# Patient Record
Sex: MALE | Race: WHITE | Employment: OTHER | URBAN - METROPOLITAN AREA
[De-identification: names, ages, dates, MRNs, and addresses within clinical notes are randomized per-mention and may not be internally consistent; named-entity substitution may affect disease eponyms.]

---

## 2019-01-16 PROBLEM — I34.0 NON-RHEUMATIC MITRAL REGURGITATION: Status: ACTIVE | Noted: 2019-01-16

## 2019-01-16 PROBLEM — I27.20 PULMONARY HYPERTENSION (HCC): Status: ACTIVE | Noted: 2019-01-16

## 2019-01-16 PROBLEM — I36.1 NON-RHEUMATIC TRICUSPID VALVE INSUFFICIENCY: Status: ACTIVE | Noted: 2019-01-16

## 2019-07-25 PROBLEM — N32.3 DIVERTICULUM OF BLADDER: Status: ACTIVE | Noted: 2018-10-10

## 2019-07-25 PROBLEM — R35.0 INCREASED FREQUENCY OF URINATION: Status: ACTIVE | Noted: 2019-07-25

## 2019-07-25 PROBLEM — F41.9 ANXIETY: Status: ACTIVE | Noted: 2019-07-25

## 2019-07-25 PROBLEM — K40.90 INGUINAL HERNIA: Status: ACTIVE | Noted: 2019-07-25

## 2019-07-25 PROBLEM — N32.89 DECREASED BLADDER CAPACITY: Status: ACTIVE | Noted: 2018-10-10

## 2019-07-25 PROBLEM — G56.00 CARPAL TUNNEL SYNDROME: Status: ACTIVE | Noted: 2019-07-25

## 2019-07-25 PROBLEM — E55.9 VITAMIN D DEFICIENCY: Status: ACTIVE | Noted: 2019-07-25

## 2019-07-25 PROBLEM — I35.1 MILD AORTIC INSUFFICIENCY: Status: ACTIVE | Noted: 2019-07-25

## 2019-07-25 PROBLEM — J30.9 ALLERGIC RHINITIS: Status: ACTIVE | Noted: 2019-07-25

## 2019-07-25 PROBLEM — R31.9 HEMATURIA: Status: ACTIVE | Noted: 2019-07-25

## 2020-01-13 PROBLEM — I48.92 ATRIAL FLUTTER (HCC): Status: ACTIVE | Noted: 2020-01-13

## 2020-01-30 PROBLEM — I45.5 SINUS PAUSE: Status: ACTIVE | Noted: 2020-01-30

## 2020-08-17 PROBLEM — Z95.0 PACEMAKER: Status: ACTIVE | Noted: 2020-08-17

## 2023-04-13 ENCOUNTER — HOSPITAL ENCOUNTER (EMERGENCY)
Facility: CLINIC | Age: 88
Discharge: HOME | End: 2023-04-13
Attending: SPECIALIST
Payer: MEDICARE

## 2023-04-13 VITALS
SYSTOLIC BLOOD PRESSURE: 134 MMHG | TEMPERATURE: 97.8 F | HEIGHT: 72 IN | OXYGEN SATURATION: 98 % | BODY MASS INDEX: 28.44 KG/M2 | RESPIRATION RATE: 14 BRPM | DIASTOLIC BLOOD PRESSURE: 81 MMHG | WEIGHT: 210 LBS | HEART RATE: 71 BPM

## 2023-04-13 DIAGNOSIS — R33.9 URINARY RETENTION: Primary | ICD-10-CM

## 2023-04-13 DIAGNOSIS — R31.9 HEMATURIA, UNSPECIFIED TYPE: ICD-10-CM

## 2023-04-13 LAB
BACTERIA: ABNORMAL
BILIRUBIN URINE: NEGATIVE
COLOR: YELLOW
EPITHELIAL CELLS UA: ABNORMAL /HPF (ref 0–5)
GLUCOSE UR STRIP.AUTO-MCNC: NEGATIVE MG/DL
KETONES UR STRIP.AUTO-MCNC: NEGATIVE MG/DL
LEUKOCYTE ESTERASE UR QL STRIP.AUTO: NEGATIVE
NITRITE UR QL STRIP.AUTO: NEGATIVE
OTHER OBSERVATIONS UA: ABNORMAL
PROT UR STRIP.AUTO-MCNC: 6.5 MG/DL (ref 5–8)
PROT UR STRIP.AUTO-MCNC: NEGATIVE MG/DL
RBC CLUMPS #/AREA URNS AUTO: ABNORMAL /HPF (ref 0–2)
SPECIFIC GRAVITY UA: 1.01 (ref 1–1.03)
TURBIDITY: CLEAR
URINE HGB: ABNORMAL
UROBILINOGEN, URINE: NORMAL
WBC UA: ABNORMAL /HPF (ref 0–5)

## 2023-04-13 PROCEDURE — 81001 URINALYSIS AUTO W/SCOPE: CPT

## 2023-04-13 PROCEDURE — 99283 EMERGENCY DEPT VISIT LOW MDM: CPT

## 2023-04-13 PROCEDURE — 51798 US URINE CAPACITY MEASURE: CPT

## 2023-04-13 PROCEDURE — 51702 INSERT TEMP BLADDER CATH: CPT

## 2023-04-13 RX ORDER — LIDOCAINE HYDROCHLORIDE 20 MG/ML
JELLY TOPICAL ONCE
Status: DISCONTINUED | OUTPATIENT
Start: 2023-04-13 | End: 2023-04-13 | Stop reason: HOSPADM

## 2023-04-13 ASSESSMENT — PAIN - FUNCTIONAL ASSESSMENT: PAIN_FUNCTIONAL_ASSESSMENT: 0-10

## 2023-04-13 ASSESSMENT — ENCOUNTER SYMPTOMS
SHORTNESS OF BREATH: 0
ABDOMINAL PAIN: 0
ABDOMINAL DISTENTION: 1
COUGH: 0
NAUSEA: 0

## 2023-04-13 ASSESSMENT — LIFESTYLE VARIABLES
HOW OFTEN DO YOU HAVE A DRINK CONTAINING ALCOHOL: NEVER
HOW MANY STANDARD DRINKS CONTAINING ALCOHOL DO YOU HAVE ON A TYPICAL DAY: PATIENT DOES NOT DRINK

## 2023-04-13 ASSESSMENT — PAIN SCALES - GENERAL: PAINLEVEL_OUTOF10: 10

## 2023-04-13 NOTE — ED PROVIDER NOTES
Constitutional:       General: He is not in acute distress. Appearance: He is well-developed. HENT:      Head: Normocephalic and atraumatic. Nose: Nose normal.   Eyes:      Extraocular Movements: Extraocular movements intact. Pupils: Pupils are equal, round, and reactive to light. Cardiovascular:      Rate and Rhythm: Normal rate and regular rhythm. Heart sounds: Normal heart sounds. No murmur heard. Pulmonary:      Effort: Pulmonary effort is normal. No respiratory distress. Breath sounds: Normal breath sounds. Abdominal:      General: Bowel sounds are normal. There is no distension or abdominal bruit. Palpations: Abdomen is soft. There is no pulsatile mass. Tenderness: There is no abdominal tenderness. Musculoskeletal:      Cervical back: Normal range of motion and neck supple. Skin:     General: Skin is warm and dry. Neurological:      General: No focal deficit present. Mental Status: He is alert and oriented to person, place, and time. Psychiatric:         Behavior: Behavior normal.         Thought Content: Thought content normal.         DIFFERENTIAL DIAGNOSIS/ MDM:     Differential diagnosis considered: Acute urinary retention due to BPH, prostate CA, UTI with hematuria      DIAGNOSTIC RESULTS     EKG: All EKG's are interpreted by the Emergency Department Physician who either signs or Co-signs this chart in the absence of a cardiologist.    None obtained    RADIOLOGY:   I reviewed the radiologist interpretations:  No orders to display       No results found.         LABS:  Labs Reviewed   URINALYSIS WITH REFLEX TO CULTURE - Abnormal; Notable for the following components:       Result Value    Urine Hgb LARGE (*)     All other components within normal limits   MICROSCOPIC URINALYSIS - Abnormal; Notable for the following components:    Bacteria, UA MODERATE (*)     Other Observations UA   (*)     Value: Utilizing a urinalysis as the only screening method to

## 2023-04-13 NOTE — DISCHARGE INSTRUCTIONS
Please understand that at this time there is no evidence for a more serious underlying process, but that early in the process of an illness or injury, an emergency department workup can be falsely reassuring. You should contact your family doctor within the next 48 hours for a follow up appointment    Rommel Huang!!!    From [facility] ([facility]) and [facility] Emergency Services    On behalf of the Emergency Department staff at [facility]), I would like to thank you for giving us the opportunity to address your health care needs and concerns. We hope that during your visit, our service was delivered in a professional and caring manner. Please keep [facility] ([facility]) in mind as we walk with you down the path to your own personal wellness. Please expect an automated text message or email from us so we can ask a few questions about your health and progress. Based on your answers, a clinician may call you back to offer help and instructions. Please understand that early in the process of an illness or injury, an emergency department workup can be falsely reassuring. If you notice any worsening, changing or persistent symptoms please call your family doctor or return to the ER immediately. Tell us how we did during your visit at [email or web address]   and let us know about your experience

## 2023-05-05 RX ORDER — SODIUM CHLORIDE, SODIUM LACTATE, POTASSIUM CHLORIDE, CALCIUM CHLORIDE 600; 310; 30; 20 MG/100ML; MG/100ML; MG/100ML; MG/100ML
1000 INJECTION, SOLUTION INTRAVENOUS CONTINUOUS
OUTPATIENT
Start: 2023-05-05

## 2023-05-09 ENCOUNTER — HOSPITAL ENCOUNTER (OUTPATIENT)
Dept: PREADMISSION TESTING | Age: 88
Discharge: HOME | End: 2023-05-13
Payer: MEDICARE

## 2023-05-09 VITALS
WEIGHT: 201 LBS | SYSTOLIC BLOOD PRESSURE: 143 MMHG | BODY MASS INDEX: 27.22 KG/M2 | OXYGEN SATURATION: 96 % | HEART RATE: 74 BPM | HEIGHT: 72 IN | RESPIRATION RATE: 18 BRPM | TEMPERATURE: 98.6 F | DIASTOLIC BLOOD PRESSURE: 78 MMHG

## 2023-05-09 LAB
ANION GAP SERPL CALCULATED.3IONS-SCNC: 10 MMOL/L (ref 9–17)
BUN SERPL-MCNC: 16 MG/DL (ref 8–23)
CHLORIDE SERPL-SCNC: 102 MMOL/L (ref 98–107)
CO2 SERPL-SCNC: 28 MMOL/L (ref 20–31)
CREAT SERPL-MCNC: 0.9 MG/DL (ref 0.7–1.2)
GFR SERPL CREATININE-BSD FRML MDRD: >60 ML/MIN/1.73M2
GLUCOSE SERPL-MCNC: 138 MG/DL (ref 70–99)
HCT VFR BLD AUTO: 46.5 % (ref 40.7–50.3)
HGB BLD-MCNC: 14.8 G/DL (ref 13–17)
MCH RBC QN AUTO: 30.3 PG (ref 25.2–33.5)
MCHC RBC AUTO-ENTMCNC: 31.8 G/DL (ref 28.4–34.8)
MCV RBC AUTO: 95.1 FL (ref 82.6–102.9)
NRBC AUTOMATED: 0 PER 100 WBC
PDW BLD-RTO: 12.9 % (ref 11.8–14.4)
PLATELET # BLD AUTO: 230 K/UL (ref 138–453)
PMV BLD AUTO: 10.4 FL (ref 8.1–13.5)
POTASSIUM SERPL-SCNC: 4.6 MMOL/L (ref 3.7–5.3)
RBC # BLD: 4.89 M/UL (ref 4.21–5.77)
SODIUM SERPL-SCNC: 140 MMOL/L (ref 135–144)
WBC # BLD AUTO: 8.5 K/UL (ref 3.5–11.3)

## 2023-05-09 PROCEDURE — 85027 COMPLETE CBC AUTOMATED: CPT

## 2023-05-09 PROCEDURE — 93005 ELECTROCARDIOGRAM TRACING: CPT | Performed by: ANESTHESIOLOGY

## 2023-05-09 PROCEDURE — 84520 ASSAY OF UREA NITROGEN: CPT

## 2023-05-09 PROCEDURE — 36415 COLL VENOUS BLD VENIPUNCTURE: CPT

## 2023-05-09 PROCEDURE — 82565 ASSAY OF CREATININE: CPT

## 2023-05-09 PROCEDURE — 80051 ELECTROLYTE PANEL: CPT

## 2023-05-09 PROCEDURE — 82947 ASSAY GLUCOSE BLOOD QUANT: CPT

## 2023-05-09 RX ORDER — CHOLECALCIFEROL (VITAMIN D3) 125 MCG
CAPSULE ORAL
COMMUNITY

## 2023-05-09 RX ORDER — METFORMIN HYDROCHLORIDE 500 MG/1
500 TABLET, EXTENDED RELEASE ORAL DAILY
COMMUNITY
Start: 2023-03-07

## 2023-05-09 RX ORDER — LEVOTHYROXINE SODIUM 0.05 MG/1
TABLET ORAL
COMMUNITY
Start: 2023-01-10

## 2023-05-09 NOTE — PROGRESS NOTES
Anesthesia Focused Assessment    Recently tested positive for COVID? No    STOP-BANG Sleep Apnea Questionnaire    SNORE loudly (heard through closed doors)? No  TIRED, fatigued, sleepy during daytime? No  OBSERVED stopping breathing during sleep? No  High blood PRESSURE being treated? Yes    BMI over 35? No  AGE over 48? Yes  NECK circumference over 16\"? No  GENDER (male)? Yes             Total 3  High risk 5-8  Intermediate risk 3-4  Low risk 0-2    Obstructive Sleep Apnea: denies  If YES, machine used: no     Type 1 DM:   no  T2DM:  mild, just started metformin    Coronary Artery Disease:  no stents, history of AF, pacemaker. Hypertension:  yes    Active smoker:  no  Drinks Alcohol:  occasionally    Dentition: upper left permanent bridge, lower right implant    Defib / AICD / Pacemaker: yes      Renal Failure/dialysis:  no    Patient was evaluated in PAT & anesthesia guidelines were applied. NPO guidelines, medication instructions and scheduled arrival time were reviewed with patient. I advised patient to please contact the surgeon's office, ahead of time if possible, if any new signs or symptoms of illness, infection, rash, etc    Hx of anesthesia complications:  no  Family hx of anesthesia complications:  no                                                                                                                     Anesthesia contacted:     Medical or cardiac clearance ordered: patient follows with Dr. Francisco Trejo, clearance in paper chart, appointment 5/15/23 for pacemaker check.     Pao Lehman PA-C  5/9/23  10:41 AM

## 2023-05-09 NOTE — H&P
History and Physical    Pt Name: Mellissa Warren  MRN: [id number]  YOB: 1929  Date of evaluation: 5/9/2023    SUBJECTIVE:   History of Chief Complaint:    Patient presents for PAT appointment. He is present with his son today. He report BPH, urinary obstruction. He says that he has been noticing symptoms since the fall, progressively worsening. He says that symptoms became severe enough that he was unable to urinate. He has had a angelo catheter in place for the past four weeks, exchanged during that time. He has been scheduled for cystoscopy, greenlight. Past Medical History    has a past medical history of Allergic rhinitis, cause unspecified, Arthritis, Atrial fibrillation ([address] BPH (benign prostatic hypertrophy), Diabetes mellitus ([address] GERD (gastroesophageal reflux disease), Hematuria, unspecified, Hypertension, Other and unspecified hyperlipidemia, Other specified cardiac dysrhythmias(427.89), Pacemaker, SVT (supraventricular tachycardia) ([address] Unspecified essential hypertension, and Wellness examination. Past Surgical History   has a past surgical history that includes Appendectomy; Cholecystectomy; Cataract removal (Right); Cardiac pacemaker placement (02/04/2020); and Colonoscopy. Medications  Prior to Admission medications    Medication Sig Start Date End Date Taking?  Authorizing Provider   levothyroxine (SYNTHROID) 50 MCG tablet levothyroxine 50 mcg tablet   take 1 tablet by mouth once daily 1/10/23  Yes Historical Provider, MD   metFORMIN (GLUCOPHAGE-XR) 500 MG extended release tablet Take 1 tablet by mouth daily 3/7/23  Yes Historical Provider, MD   Lactobacillus (PROBIOTIC ACIDOPHILUS) CAPS Take by mouth   Yes Historical Provider, MD   vitamin D (CHOLECALCIFEROL) 25 MCG (1000 UT) TABS tablet Take 2 tablets by mouth daily   Yes Historical Provider, MD   Multiple Vitamins-Minerals (PRESERVISION AREDS 2+MULTI VIT PO) PreserVision AREDS   1 tablet twice daily    Historical Provider, MD

## 2023-05-10 LAB
EKG ATRIAL RATE: 67 BPM
EKG Q-T INTERVAL: 396 MS
EKG QRS DURATION: 94 MS
EKG QTC CALCULATION (BAZETT): 421 MS
EKG R AXIS: -18 DEGREES
EKG T AXIS: 20 DEGREES
EKG VENTRICULAR RATE: 68 BPM

## 2023-05-10 PROCEDURE — 93010 ELECTROCARDIOGRAM REPORT: CPT | Performed by: INTERNAL MEDICINE

## 2023-05-22 ENCOUNTER — ANESTHESIA (OUTPATIENT)
Dept: OPERATING ROOM | Age: 88
End: 2023-05-22
Payer: MEDICARE

## 2023-05-22 ENCOUNTER — HOSPITAL ENCOUNTER (OUTPATIENT)
Age: 88
Setting detail: OUTPATIENT SURGERY
Discharge: HOME | End: 2023-05-22
Attending: UROLOGY | Admitting: UROLOGY
Payer: MEDICARE

## 2023-05-22 ENCOUNTER — ANESTHESIA EVENT (OUTPATIENT)
Dept: OPERATING ROOM | Age: 88
End: 2023-05-22
Payer: MEDICARE

## 2023-05-22 VITALS
SYSTOLIC BLOOD PRESSURE: 167 MMHG | WEIGHT: 203 LBS | HEIGHT: 72 IN | DIASTOLIC BLOOD PRESSURE: 86 MMHG | TEMPERATURE: 97.3 F | HEART RATE: 66 BPM | RESPIRATION RATE: 14 BRPM | BODY MASS INDEX: 27.5 KG/M2 | OXYGEN SATURATION: 95 %

## 2023-05-22 LAB
GLUCOSE BLD-MCNC: 135 MG/DL (ref 75–110)
GLUCOSE BLD-MCNC: 162 MG/DL (ref 74–100)
POTASSIUM BLD-SCNC: 4 MMOL/L (ref 3.5–4.5)

## 2023-05-22 PROCEDURE — 84132 ASSAY OF SERUM POTASSIUM: CPT

## 2023-05-22 PROCEDURE — 3700000001 HC ADD 15 MINUTES (ANESTHESIA): Performed by: UROLOGY

## 2023-05-22 PROCEDURE — 6360000002 HC RX W HCPCS: Performed by: SPECIALIST

## 2023-05-22 PROCEDURE — 7100000001 HC PACU RECOVERY - ADDTL 15 MIN: Performed by: UROLOGY

## 2023-05-22 PROCEDURE — 7100000010 HC PHASE II RECOVERY - FIRST 15 MIN: Performed by: UROLOGY

## 2023-05-22 PROCEDURE — 82947 ASSAY GLUCOSE BLOOD QUANT: CPT

## 2023-05-22 PROCEDURE — 6360000002 HC RX W HCPCS: Performed by: STUDENT IN AN ORGANIZED HEALTH CARE EDUCATION/TRAINING PROGRAM

## 2023-05-22 PROCEDURE — 2709999900 HC NON-CHARGEABLE SUPPLY: Performed by: UROLOGY

## 2023-05-22 PROCEDURE — 2580000003 HC RX 258: Performed by: ANESTHESIOLOGY

## 2023-05-22 PROCEDURE — 2720000010 HC SURG SUPPLY STERILE: Performed by: UROLOGY

## 2023-05-22 PROCEDURE — 3600000004 HC SURGERY LEVEL 4 BASE: Performed by: UROLOGY

## 2023-05-22 PROCEDURE — 3600000014 HC SURGERY LEVEL 4 ADDTL 15MIN: Performed by: UROLOGY

## 2023-05-22 PROCEDURE — 7100000000 HC PACU RECOVERY - FIRST 15 MIN: Performed by: UROLOGY

## 2023-05-22 PROCEDURE — 2580000003 HC RX 258: Performed by: UROLOGY

## 2023-05-22 PROCEDURE — 3700000000 HC ANESTHESIA ATTENDED CARE: Performed by: UROLOGY

## 2023-05-22 RX ORDER — MAGNESIUM HYDROXIDE 1200 MG/15ML
LIQUID ORAL CONTINUOUS PRN
Status: DISCONTINUED | OUTPATIENT
Start: 2023-05-22 | End: 2023-05-22 | Stop reason: HOSPADM

## 2023-05-22 RX ORDER — SODIUM CHLORIDE 9 MG/ML
INJECTION, SOLUTION INTRAVENOUS PRN
Status: DISCONTINUED | OUTPATIENT
Start: 2023-05-22 | End: 2023-05-22 | Stop reason: HOSPADM

## 2023-05-22 RX ORDER — FENTANYL CITRATE 50 UG/ML
50 INJECTION, SOLUTION INTRAMUSCULAR; INTRAVENOUS EVERY 5 MIN PRN
Status: DISCONTINUED | OUTPATIENT
Start: 2023-05-22 | End: 2023-05-22 | Stop reason: HOSPADM

## 2023-05-22 RX ORDER — ONDANSETRON 2 MG/ML
4 INJECTION INTRAMUSCULAR; INTRAVENOUS
Status: DISCONTINUED | OUTPATIENT
Start: 2023-05-22 | End: 2023-05-22 | Stop reason: HOSPADM

## 2023-05-22 RX ORDER — FENTANYL CITRATE 50 UG/ML
INJECTION, SOLUTION INTRAMUSCULAR; INTRAVENOUS PRN
Status: DISCONTINUED | OUTPATIENT
Start: 2023-05-22 | End: 2023-05-22 | Stop reason: SDUPTHER

## 2023-05-22 RX ORDER — ONDANSETRON 2 MG/ML
INJECTION INTRAMUSCULAR; INTRAVENOUS PRN
Status: DISCONTINUED | OUTPATIENT
Start: 2023-05-22 | End: 2023-05-22 | Stop reason: SDUPTHER

## 2023-05-22 RX ORDER — PROPOFOL 10 MG/ML
INJECTION, EMULSION INTRAVENOUS PRN
Status: DISCONTINUED | OUTPATIENT
Start: 2023-05-22 | End: 2023-05-22 | Stop reason: SDUPTHER

## 2023-05-22 RX ORDER — SODIUM CHLORIDE, SODIUM LACTATE, POTASSIUM CHLORIDE, CALCIUM CHLORIDE 600; 310; 30; 20 MG/100ML; MG/100ML; MG/100ML; MG/100ML
1000 INJECTION, SOLUTION INTRAVENOUS CONTINUOUS
Status: DISCONTINUED | OUTPATIENT
Start: 2023-05-22 | End: 2023-05-22 | Stop reason: HOSPADM

## 2023-05-22 RX ORDER — SODIUM CHLORIDE 0.9 % (FLUSH) 0.9 %
5-40 SYRINGE (ML) INJECTION EVERY 12 HOURS SCHEDULED
Status: DISCONTINUED | OUTPATIENT
Start: 2023-05-22 | End: 2023-05-22 | Stop reason: HOSPADM

## 2023-05-22 RX ORDER — SODIUM CHLORIDE 0.9 % (FLUSH) 0.9 %
5-40 SYRINGE (ML) INJECTION PRN
Status: DISCONTINUED | OUTPATIENT
Start: 2023-05-22 | End: 2023-05-22 | Stop reason: HOSPADM

## 2023-05-22 RX ORDER — HYDRALAZINE HYDROCHLORIDE 20 MG/ML
10 INJECTION INTRAMUSCULAR; INTRAVENOUS
Status: DISCONTINUED | OUTPATIENT
Start: 2023-05-22 | End: 2023-05-22 | Stop reason: HOSPADM

## 2023-05-22 RX ORDER — FENTANYL CITRATE 50 UG/ML
25 INJECTION, SOLUTION INTRAMUSCULAR; INTRAVENOUS EVERY 5 MIN PRN
Status: DISCONTINUED | OUTPATIENT
Start: 2023-05-22 | End: 2023-05-22 | Stop reason: HOSPADM

## 2023-05-22 RX ORDER — CEPHALEXIN 500 MG/1
500 CAPSULE ORAL 4 TIMES DAILY
Qty: 20 CAPSULE | Refills: 0 | Status: SHIPPED | OUTPATIENT
Start: 2023-05-22 | End: 2023-05-27

## 2023-05-22 RX ADMIN — PHENYLEPHRINE HYDROCHLORIDE 100 MCG: 10 INJECTION INTRAVENOUS at 08:19

## 2023-05-22 RX ADMIN — ONDANSETRON 4 MG: 2 INJECTION INTRAMUSCULAR; INTRAVENOUS at 09:09

## 2023-05-22 RX ADMIN — SODIUM CHLORIDE, POTASSIUM CHLORIDE, SODIUM LACTATE AND CALCIUM CHLORIDE: 600; 310; 30; 20 INJECTION, SOLUTION INTRAVENOUS at 09:30

## 2023-05-22 RX ADMIN — SODIUM CHLORIDE, POTASSIUM CHLORIDE, SODIUM LACTATE AND CALCIUM CHLORIDE 1000 ML: 600; 310; 30; 20 INJECTION, SOLUTION INTRAVENOUS at 06:33

## 2023-05-22 RX ADMIN — PHENYLEPHRINE HYDROCHLORIDE 100 MCG: 10 INJECTION INTRAVENOUS at 08:11

## 2023-05-22 RX ADMIN — PROPOFOL 40 MG: 10 INJECTION, EMULSION INTRAVENOUS at 07:45

## 2023-05-22 RX ADMIN — PHENYLEPHRINE HYDROCHLORIDE 100 MCG: 10 INJECTION INTRAVENOUS at 08:16

## 2023-05-22 RX ADMIN — FENTANYL CITRATE 25 MCG: 50 INJECTION, SOLUTION INTRAMUSCULAR; INTRAVENOUS at 09:06

## 2023-05-22 RX ADMIN — FENTANYL CITRATE 25 MCG: 50 INJECTION, SOLUTION INTRAMUSCULAR; INTRAVENOUS at 08:34

## 2023-05-22 RX ADMIN — Medication 2000 MG: at 07:48

## 2023-05-22 RX ADMIN — FENTANYL CITRATE 25 MCG: 50 INJECTION, SOLUTION INTRAMUSCULAR; INTRAVENOUS at 07:35

## 2023-05-22 RX ADMIN — PROPOFOL 50 MG: 10 INJECTION, EMULSION INTRAVENOUS at 07:42

## 2023-05-22 RX ADMIN — FENTANYL CITRATE 25 MCG: 50 INJECTION, SOLUTION INTRAMUSCULAR; INTRAVENOUS at 08:52

## 2023-05-22 RX ADMIN — PHENYLEPHRINE HYDROCHLORIDE 100 MCG: 10 INJECTION INTRAVENOUS at 08:02

## 2023-05-22 ASSESSMENT — PAIN - FUNCTIONAL ASSESSMENT: PAIN_FUNCTIONAL_ASSESSMENT: NONE - DENIES PAIN

## 2023-05-22 ASSESSMENT — PAIN SCALES - GENERAL: PAINLEVEL_OUTOF10: 0

## 2023-05-22 NOTE — ANESTHESIA POSTPROCEDURE EVALUATION
Department of Anesthesiology  Postprocedure Note    Patient: Shira Estrada  MRN: [id number]  YOB: 1929  Date of evaluation: 5/22/2023      Procedure Summary     Date: 05/22/23 Room / Location: [facility]    Anesthesia Start: 0730 Anesthesia Stop: 0929    Procedure: CYSTOSCOPY GREENLIGHT  FORTEC Diagnosis:       Benign prostatic hyperplasia, unspecified whether lower urinary tract symptoms present      (BPH)    Surgeons: Levie Claude, MD Responsible Provider: Hue Nixon MD    Anesthesia Type: general ASA Status: 3          Anesthesia Type: No value filed.     Monty Phase I:      Monty Phase II:        Anesthesia Post Evaluation    Patient location during evaluation: bedside  Patient participation: complete - patient participated  Level of consciousness: awake  Airway patency: patent  Nausea & Vomiting: no nausea and no vomiting  Complications: no  Cardiovascular status: blood pressure returned to baseline  Respiratory status: acceptable  Hydration status: euvolemic  Comments: BP (!) 155/81   Pulse 69   Temp 97.3 °F (36.3 °C)   Resp 13   Ht 6' (1.829 m)   Wt 203 lb (92.1 kg)   SpO2 99%   BMI 27.53 kg/m²

## 2023-05-22 NOTE — OP NOTE
FACILITY:  [place], [place]    PATIENT:  Lena Ryan  MRN: [id number]  YOB: 1929   DATE: 5/22/2023     SURGEON: Dr. Donaldo Parks MD  ASSISTANT: Francisco Holder DO, PGY-3     PREOPERATIVE DIAGNOSIS: BPH with obstruction      POSTOPERATIVE DIAGNOSIS: BPH with obstruction      PROCEDURES PERFORMED:  1. Cystoscopy. 2. GreenLight photovaporization of the prostate. ANESTHESIA: General  COMPLICATIONS: none  DRAINS: 22 French 3-way angelo catheter - coude, hematuria  SPECIMEN: none  ESTIMATED BLOOD LOSS: less than 50 ml     INDICATIONS:  This is a 80 y.o. male presents today for GreenLight photovaporization of the prostate. After risks, benefits and alternatives of the procedure were discussed with the patient, informed consent was obtained and the patient elected to proceed. The patient was given Ancef 2g IV on call to OR for antibiotic prophylaxis. Patient had EPC cuffs placed for VTE prophylaxis. DETAILS OF PROCEDURE:  General anesthesia was induced and the patient was placed in the dorsal lithotomy position. A time-out was performed to confirm patient identity and procedure. He was then prepped and draped in the usual sterile fashion. A continuous flow sheath with obturator and lens was inserted through the patient's urethra and into the bladder. Upon entering the bladder, both ureteral orifices were located and found to be a safe distance from the vesical neck. The GreenLight fiber was then inserted after the obturator was removed and the working bridge was placed through the continous flow sheath. GreenLight photovaporization was initiated beginning at the 6 o'clock position and a groove was made starting at the bladder neck and extending back to the verumontum. After this, the patient's lateral lobe BPH was addressed. The adenomatous tissue was vaporized down to the level of the capsule, starting first on the right then on the left.   The adenomatous tissue

## 2023-05-22 NOTE — ANESTHESIA PRE PROCEDURE
PVC (premature ventricular contraction)     SVT (supraventricular tachycardia) (HCC)     Unspecified essential hypertension     Wellness examination     Dr. Ilan LEAHY MD-[facility] last appt 2023       Past Surgical History:        Procedure Laterality Date    APPENDECTOMY      CARDIAC PACEMAKER PLACEMENT  2020    Oral-MDT    CATARACT REMOVAL Right     CHOLECYSTECTOMY      COLONOSCOPY         Social History:    Social History     Tobacco Use    Smoking status: Former     Types: Cigarettes     Quit date: 1960     Years since quittin.1    Smokeless tobacco: Never   Substance Use Topics    Alcohol use: Yes     Alcohol/week: 1.0 standard drink     Types: 1 Glasses of wine per week     Comment: 1 glass of wine a week sometimes                                Counseling given: Not Answered      Vital Signs (Current):   Vitals:    23 0610   BP: (!) 180/99   Pulse: 73   Resp: 18   Temp: 97 °F (36.1 °C)   TempSrc: Temporal   SpO2: 97%   Weight: 203 lb (92.1 kg)   Height: 6' (1.829 m)                                              BP Readings from Last 3 Encounters:   23 (!) 180/99   23 (!) 143/78   23 134/81       NPO Status: Time of last liquid consumption: 2300                        Time of last solid consumption: 1700                        Date of last liquid consumption: 23                        Date of last solid food consumption: 23    BMI:   Wt Readings from Last 3 Encounters:   23 203 lb (92.1 kg)   23 201 lb (91.2 kg)   23 210 lb (95.3 kg)     Body mass index is 27.53 kg/m².     CBC:   Lab Results   Component Value Date/Time    WBC 8.5 2023 12:08 PM    RBC 4.89 2023 12:08 PM    HGB 14.8 2023 12:08 PM    HCT 46.5 2023 12:08 PM    MCV 95.1 2023 12:08 PM    RDW 12.9 2023 12:08 PM     2023 12:08 PM       CMP:   Lab Results   Component Value Date/Time     2023 12:08 PM

## 2023-05-22 NOTE — H&P
H&P reviewed from 5/9/23 and patient seen and examined. There are no changes. Plan for OR for Cystoscopy, greenlight PVP. [facility], DO, PGY-3  05/22/23  ______________________________________________________________________     History and Physical    Pt Name: Teto Bentley  MRN: [id number]  YOB: 1929  Date of evaluation: 5/22/2023    SUBJECTIVE:   History of Chief Complaint:    Patient presents for PAT appointment. He is present with his son today. He report BPH, urinary obstruction. He says that he has been noticing symptoms since the fall, progressively worsening. He says that symptoms became severe enough that he was unable to urinate. He has had a angelo catheter in place for the past four weeks, exchanged during that time. He has been scheduled for cystoscopy, greenlight. Past Medical History    has a past medical history of Acquired postural kyphosis, Allergic rhinitis, cause unspecified, Arthritis, Atrial fibrillation ([address] BPH (benign prostatic hypertrophy), Diabetes mellitus ([address] Angelo catheter in place, GERD (gastroesophageal reflux disease), Hematuria, unspecified, Hypertension, Other and unspecified hyperlipidemia, Other specified cardiac dysrhythmias(427.89), Pacemaker, PVC (premature ventricular contraction), SVT (supraventricular tachycardia) ([address] Unspecified essential hypertension, and Wellness examination. Past Surgical History   has a past surgical history that includes Appendectomy; Cholecystectomy; Cataract removal (Right); Cardiac pacemaker placement (02/04/2020); and Colonoscopy. Medications  Prior to Admission medications    Medication Sig Start Date End Date Taking?  Authorizing Provider   Multiple Vitamins-Minerals (PRESERVISION AREDS 2+MULTI VIT PO) PreserVision AREDS   1 tablet twice daily    Historical Provider, MD   levothyroxine (SYNTHROID) 50 MCG tablet levothyroxine 50 mcg tablet   take 1 tablet by mouth once daily 1/10/23   Historical Provider, MD

## 2023-05-22 NOTE — DISCHARGE INSTRUCTIONS
balloon holds the catheter in place. EQUIPMENT  Soap and water  Washcloth and towel  DAILY CARE  Wash the area around the catheter with mild soap and water daily. Wash from front to back for females. Rinse areas with warm water. Dry  Wash the catheter from body to the connecting tubing. REMINDER  Avoid pulling on the catheter. Tape or secure catheter to the upper leg. Keep drainage bag below the level of the bladder. Drink enough fluids to keep the urine clear or pale yellow. Check with your doctor about any limits on fluid intake. You may shower with the catheter in place. Do not take tub baths. Do not use creams or ointments around the catheter unless ordered by your doctor. The catheter should be changed regularly. This may be done by a home care nurse or doctor. Be sure to keep all appointments for catheter change. CALL YOUR DOCTOR IF YOU:  Have pain where the catheter enters your body or in your bladder. Have bloody or pus-like drainage around the catheter. Have a fever over 100.4 degrees. See blood in the urine that has not been there before.

## 2023-05-24 ENCOUNTER — HOSPITAL ENCOUNTER (EMERGENCY)
Facility: CLINIC | Age: 88
Discharge: HOME | End: 2023-05-24
Attending: EMERGENCY MEDICINE
Payer: MEDICARE

## 2023-05-24 VITALS
OXYGEN SATURATION: 97 % | HEART RATE: 107 BPM | SYSTOLIC BLOOD PRESSURE: 178 MMHG | DIASTOLIC BLOOD PRESSURE: 106 MMHG | RESPIRATION RATE: 18 BRPM

## 2023-05-24 DIAGNOSIS — R33.9 URINARY RETENTION: Primary | ICD-10-CM

## 2023-05-24 PROCEDURE — 51702 INSERT TEMP BLADDER CATH: CPT

## 2023-05-24 PROCEDURE — 99283 EMERGENCY DEPT VISIT LOW MDM: CPT

## 2023-05-24 PROCEDURE — 6370000000 HC RX 637 (ALT 250 FOR IP): Performed by: EMERGENCY MEDICINE

## 2023-05-24 RX ORDER — LIDOCAINE HYDROCHLORIDE 20 MG/ML
JELLY TOPICAL ONCE
Status: COMPLETED | OUTPATIENT
Start: 2023-05-24 | End: 2023-05-24

## 2023-05-24 RX ADMIN — LIDOCAINE HYDROCHLORIDE: 20 JELLY TOPICAL at 18:31

## 2023-05-24 ASSESSMENT — PAIN - FUNCTIONAL ASSESSMENT: PAIN_FUNCTIONAL_ASSESSMENT: 0-10

## 2023-05-24 ASSESSMENT — PAIN SCALES - GENERAL: PAINLEVEL_OUTOF10: 10

## 2023-05-24 NOTE — ED NOTES
Writer at bedside to insert indwelling angelo cath. Pt tolerated well. Positive urine return upon insertion. Initial urine red ith small clots, then immediately draining clear and yellow post clot passing. Pt reports no pain during procedure. Pt output 600mL total post cath insertion.  Education given and provider notifed     Jessenia Rojas RN  05/24/23 [id number]

## 2023-05-24 NOTE — ED PROVIDER NOTES
[facility]  [address] [facility]  Phone: [facility]  EMERGENCY DEPARTMENT ENCOUNTER      Pt Name: Sofia Castellano  MRN: [id number]  [place] 2/3/1929  Date of evaluation: 5/24/2023  Provider: Linda Donahue DO    CHIEF COMPLAINT       Chief Complaint   Patient presents with    Urinary Retention     Sanford removed today at 2pm from green light prostate procedure on Monday, had some blood and a few drops come out at home, then couldn't go         HISTORY OF PRESENT ILLNESS   (Location/Symptom, Timing/Onset,Context/Setting, Quality, Duration, Modifying Factors, Severity)  Note limiting factors. Sofia Castellano is a 80 y.o. male who presents to the emergency department for the evaluation of urinary retention. Patient reports that he had urinary retention about 5 weeks ago and came to this emergency department. He reports that a Sanford catheter was placed and he followed up with his urologist, Dr. Fernando Paul. He reportedly had BPH and had a greenlight procedure done with a catheter in. That was removed today in the office. He states he did not undergo a trial of urination in the office. They told him to drink 8 glasses of water which he did and he has not been able to urinate. The office is now closed and they referred him back to the emergency department for evaluation because he cannot urinate    Nursing Notes were reviewed. REVIEW OF SYSTEMS    (2-9systems for level 4, 10 or more for level 5)     Review of Systems   Constitutional:  Negative for fever. Genitourinary:         Inability to urinate   Neurological:  Negative for weakness and numbness. Except asnoted above the remainder of the review of systems was reviewed and negative.        PAST MEDICAL HISTORY     Past Medical History:   Diagnosis Date    Acquired postural kyphosis     Allergic rhinitis, cause unspecified     Arthritis     Atrial fibrillation (HCC)     Dr. Luann Marino Cardiology

## 2023-12-15 LAB
AMIKACIN: ABNORMAL
AMOXICILLIN + CLAVULANATE: ABNORMAL
AMPICILLIN: ABNORMAL
AZTREONAM: ABNORMAL
CEFAZOLIN: ABNORMAL
CEFEPIME: ABNORMAL
CEFOXITIN: ABNORMAL
CEFTRIAXONE: ABNORMAL
CIPROFLOXACIN: ABNORMAL
CULTURE RESULT: ABNORMAL
ERTAPENEM: ABNORMAL
GENTAMICIN: ABNORMAL
IMIPENEM: ABNORMAL
LEVOFLOXACIN: ABNORMAL
NITROFURANTOIN: ABNORMAL
PIPERACILLIN + TAZOBACTAM: ABNORMAL
TIGECYCLINE: ABNORMAL
TOBRAMYCIN: ABNORMAL
TRIMETHOPRIM + SULFAMETHOXAZOLE: ABNORMAL
URINE CULTURE, ROUTINE: ABNORMAL STATUS

## 2024-02-12 ENCOUNTER — HOSPITAL ENCOUNTER (OUTPATIENT)
Age: 89
Setting detail: SPECIMEN
Discharge: HOME | End: 2024-02-12

## 2024-02-13 LAB
MICROORGANISM SPEC CULT: NO GROWTH
SPECIMEN DESCRIPTION: NORMAL

## 2025-08-20 LAB — PROSTATE SPECIFIC ANTIGEN: 8.9 NG/ML

## (undated) DEVICE — DRAPE,REIN 53X77,STERILE: Brand: MEDLINE

## (undated) DEVICE — PROTECTOR ULN NRV PUR FOAM HK LOOP STRP ANATOMICALLY

## (undated) DEVICE — CYSTO/BLADDER IRRIGATION SET, REGULATING CLAMP

## (undated) DEVICE — LASER SURG W22XH58IN D36IN 475LB SLD STATE FREQ DOUBLED

## (undated) DEVICE — CATHETER,FOLEY,3-WAY,22FR,30ML,100%SILI: Brand: MEDLINE

## (undated) DEVICE — DRAINBAG,ANTI-REFLUX TOWER,L/F,2000ML,LL: Brand: MEDLINE

## (undated) DEVICE — CATHETER F BLLN 30CC 22FR 3 W SPEC HEMA LNG OPN COUDE TIP

## (undated) DEVICE — GLOVE SURG SZ 65 THK91MIL LTX FREE SYN POLYISOPRENE

## (undated) DEVICE — GARMENT,MEDLINE,DVT,INT,CALF,MED, GEN2: Brand: MEDLINE

## (undated) DEVICE — PACK PROCEDURE SURG CYSTO SVMMC LF

## (undated) DEVICE — RENTAL LASER XPS CASE